# Patient Record
Sex: MALE | Race: OTHER | Employment: UNEMPLOYED | URBAN - METROPOLITAN AREA
[De-identification: names, ages, dates, MRNs, and addresses within clinical notes are randomized per-mention and may not be internally consistent; named-entity substitution may affect disease eponyms.]

---

## 2024-08-30 ENCOUNTER — APPOINTMENT (EMERGENCY)
Dept: CT IMAGING | Facility: HOSPITAL | Age: 39
End: 2024-08-30

## 2024-08-30 ENCOUNTER — HOSPITAL ENCOUNTER (EMERGENCY)
Facility: HOSPITAL | Age: 39
Discharge: HOME | End: 2024-08-31
Attending: EMERGENCY MEDICINE

## 2024-08-30 VITALS
SYSTOLIC BLOOD PRESSURE: 151 MMHG | RESPIRATION RATE: 18 BRPM | WEIGHT: 224.43 LBS | DIASTOLIC BLOOD PRESSURE: 91 MMHG | TEMPERATURE: 99.6 F | OXYGEN SATURATION: 97 % | HEART RATE: 77 BPM

## 2024-08-30 DIAGNOSIS — R22.2 MASS OF BUTTOCK: Primary | ICD-10-CM

## 2024-08-30 DIAGNOSIS — L03.317 CELLULITIS OF BUTTOCK: ICD-10-CM

## 2024-08-30 LAB
ALBUMIN SERPL BCG-MCNC: 4.5 G/DL (ref 3.5–5)
ALP SERPL-CCNC: 62 U/L (ref 34–104)
ALT SERPL W P-5'-P-CCNC: 19 U/L (ref 7–52)
ANION GAP SERPL CALCULATED.3IONS-SCNC: 8 MMOL/L (ref 4–13)
APTT PPP: 36 SECONDS (ref 23–34)
AST SERPL W P-5'-P-CCNC: 15 U/L (ref 13–39)
BASOPHILS # BLD AUTO: 0.06 THOUSANDS/ÂΜL (ref 0–0.1)
BASOPHILS NFR BLD AUTO: 1 % (ref 0–1)
BILIRUB SERPL-MCNC: 0.32 MG/DL (ref 0.2–1)
BUN SERPL-MCNC: 12 MG/DL (ref 5–25)
CALCIUM SERPL-MCNC: 9.3 MG/DL (ref 8.4–10.2)
CHLORIDE SERPL-SCNC: 101 MMOL/L (ref 96–108)
CO2 SERPL-SCNC: 28 MMOL/L (ref 21–32)
CREAT SERPL-MCNC: 0.95 MG/DL (ref 0.6–1.3)
EOSINOPHIL # BLD AUTO: 0.09 THOUSAND/ÂΜL (ref 0–0.61)
EOSINOPHIL NFR BLD AUTO: 1 % (ref 0–6)
ERYTHROCYTE [DISTWIDTH] IN BLOOD BY AUTOMATED COUNT: 12.7 % (ref 11.6–15.1)
GFR SERPL CREATININE-BSD FRML MDRD: 101 ML/MIN/1.73SQ M
GLUCOSE SERPL-MCNC: 105 MG/DL (ref 65–140)
HCT VFR BLD AUTO: 42.2 % (ref 36.5–49.3)
HGB BLD-MCNC: 14.3 G/DL (ref 12–17)
IMM GRANULOCYTES # BLD AUTO: 0.03 THOUSAND/UL (ref 0–0.2)
IMM GRANULOCYTES NFR BLD AUTO: 0 % (ref 0–2)
INR PPP: 1.11 (ref 0.85–1.19)
LACTATE SERPL-SCNC: 1.3 MMOL/L (ref 0.5–2)
LYMPHOCYTES # BLD AUTO: 1.45 THOUSANDS/ÂΜL (ref 0.6–4.47)
LYMPHOCYTES NFR BLD AUTO: 14 % (ref 14–44)
MCH RBC QN AUTO: 28.4 PG (ref 26.8–34.3)
MCHC RBC AUTO-ENTMCNC: 33.9 G/DL (ref 31.4–37.4)
MCV RBC AUTO: 84 FL (ref 82–98)
MONOCYTES # BLD AUTO: 0.49 THOUSAND/ÂΜL (ref 0.17–1.22)
MONOCYTES NFR BLD AUTO: 5 % (ref 4–12)
NEUTROPHILS # BLD AUTO: 8.32 THOUSANDS/ÂΜL (ref 1.85–7.62)
NEUTS SEG NFR BLD AUTO: 79 % (ref 43–75)
NRBC BLD AUTO-RTO: 0 /100 WBCS
PLATELET # BLD AUTO: 406 THOUSANDS/UL (ref 149–390)
PMV BLD AUTO: 9.7 FL (ref 8.9–12.7)
POTASSIUM SERPL-SCNC: 3.1 MMOL/L (ref 3.5–5.3)
PROCALCITONIN SERPL-MCNC: <0.05 NG/ML
PROT SERPL-MCNC: 7.6 G/DL (ref 6.4–8.4)
PROTHROMBIN TIME: 14.5 SECONDS (ref 12.3–15)
RBC # BLD AUTO: 5.04 MILLION/UL (ref 3.88–5.62)
SODIUM SERPL-SCNC: 137 MMOL/L (ref 135–147)
WBC # BLD AUTO: 10.44 THOUSAND/UL (ref 4.31–10.16)

## 2024-08-30 PROCEDURE — 36415 COLL VENOUS BLD VENIPUNCTURE: CPT

## 2024-08-30 PROCEDURE — 99285 EMERGENCY DEPT VISIT HI MDM: CPT

## 2024-08-30 PROCEDURE — 85610 PROTHROMBIN TIME: CPT

## 2024-08-30 PROCEDURE — 87040 BLOOD CULTURE FOR BACTERIA: CPT

## 2024-08-30 PROCEDURE — 96361 HYDRATE IV INFUSION ADD-ON: CPT

## 2024-08-30 PROCEDURE — 84145 PROCALCITONIN (PCT): CPT

## 2024-08-30 PROCEDURE — 80053 COMPREHEN METABOLIC PANEL: CPT

## 2024-08-30 PROCEDURE — 85025 COMPLETE CBC W/AUTO DIFF WBC: CPT

## 2024-08-30 PROCEDURE — 85730 THROMBOPLASTIN TIME PARTIAL: CPT

## 2024-08-30 PROCEDURE — 74177 CT ABD & PELVIS W/CONTRAST: CPT

## 2024-08-30 PROCEDURE — NC001 PR NO CHARGE: Performed by: SURGERY

## 2024-08-30 PROCEDURE — 96374 THER/PROPH/DIAG INJ IV PUSH: CPT

## 2024-08-30 PROCEDURE — 99283 EMERGENCY DEPT VISIT LOW MDM: CPT

## 2024-08-30 PROCEDURE — 83605 ASSAY OF LACTIC ACID: CPT

## 2024-08-30 RX ORDER — LIDOCAINE HYDROCHLORIDE 10 MG/ML
10 INJECTION, SOLUTION EPIDURAL; INFILTRATION; INTRACAUDAL; PERINEURAL ONCE
Status: COMPLETED | OUTPATIENT
Start: 2024-08-31 | End: 2024-08-30

## 2024-08-30 RX ORDER — HYDROMORPHONE HCL/PF 1 MG/ML
0.5 SYRINGE (ML) INJECTION
Status: DISCONTINUED | OUTPATIENT
Start: 2024-08-30 | End: 2024-08-31 | Stop reason: HOSPADM

## 2024-08-30 RX ADMIN — IOHEXOL 100 ML: 350 INJECTION, SOLUTION INTRAVENOUS at 21:52

## 2024-08-30 RX ADMIN — SODIUM CHLORIDE 1000 ML: 0.9 INJECTION, SOLUTION INTRAVENOUS at 21:19

## 2024-08-30 RX ADMIN — HYDROMORPHONE HYDROCHLORIDE 0.5 MG: 0.5 INJECTION, SOLUTION INTRAMUSCULAR; INTRAVENOUS; SUBCUTANEOUS at 23:58

## 2024-08-30 RX ADMIN — LIDOCAINE HYDROCHLORIDE 10 ML: 10 INJECTION, SOLUTION EPIDURAL; INFILTRATION; INTRACAUDAL at 23:56

## 2024-08-31 PROCEDURE — NC001 PR NO CHARGE: Performed by: SURGERY

## 2024-08-31 PROCEDURE — 88305 TISSUE EXAM BY PATHOLOGIST: CPT | Performed by: PATHOLOGY

## 2024-08-31 NOTE — DISCHARGE INSTRUCTIONS
Follow wound care instructions as discussed.  Complete course of Augmentin.  Follow-up with surgical specialist.

## 2024-08-31 NOTE — CONSULTS
Consult: General Surgery  Katheryn Downing 38 y.o. male MRN: [id number]  Unit/Bed#: ED-40 Encounter: [id number]        Assessment & Plan     Assessment:  Patient is a 38 y.o. male with a past medical history of hemorrhoids and underwent hemorrhoidectomy in [place] in 2018; he presented for worsening pain of the right gluteal cleft/perianal mass and given there was concern for perianal abscess General Surgery was consulted.  The mass did have a small amount of serous drainage but is nodular in nature; given the scant amount of drainage the mass was incised which confirmed that it was a solid mass and biopsies were sent.  There was a small amount of overlying cellulitis and induration which may be a superinfection.    Afebrile, VSS  WBC: 10  CTAP showed Area of soft tissue prominence noted within the subcutaneous soft tissues of the medial right gluteal region extending from the skin surface to the level of the right perianal region, suspicious for an infectious or inflammatory process. No soft tissue gas is identified. A perianal abscess/fistula cannot be excluded    Plan:  -Biopsies performed of the bilobed perianal/gluteal cleft mass  - Will send the patient on 4 days of Augmentin for potential superinfection  - Lab the patient follow-up in colorectal clinic     History of Present Illness     HPI:  Katheryn Downing is a 38 y.o. male with pmhx of hemorrhoids s/p hemorrhoidectomy in [place] in 2018, additionally he reports 2-year history of a right gluteal cleft/perianal mass.  He reports that the mass intermittently will become painful, and drain.  He said that over the past week is becoming more painful which brought him into the ED.  He denies fevers, chills, chest pain, shortness of breath, and dysuria.  He denies any pain with defecation, fecal incontinence, or leakage of stool in this area.  He denies any previous incision and drainages or procedures on this mass.  He denies any changes in the caliber of his  stools.  He does report intermittent rectal bleeding.  He denies ever having undergone a colonoscopy.  He denies night sweats, weight loss, fatigue, and appetite loss.    Review of Systems  As stated in above HPI, otherwise negative.  Consults    Historical Information   History reviewed. No pertinent past medical history.  History reviewed. No pertinent surgical history.  Social History   Social History     Substance and Sexual Activity   Alcohol Use Yes    Alcohol/week: 2.0 - 3.0 standard drinks of alcohol    Types: 2 - 3 Glasses of wine per week     Social History     Substance and Sexual Activity   Drug Use Never     Social History     Tobacco Use   Smoking Status Never    Passive exposure: Never   Smokeless Tobacco Never     Family History: non-contributory    Meds/Allergies   all medications and allergies reviewed  No Known Allergies    Objective   First Vitals:   Blood Pressure: 163/98 (08/30/24 1942)  Pulse: 102 (08/30/24 1942)  Temperature: 99.6 °F (37.6 °C) (08/30/24 1942)  Temp Source: Oral (08/30/24 1942)  Respirations: 18 (08/30/24 1942)  Weight - Scale: 102 kg (224 lb 6.9 oz) (08/30/24 1939)  SpO2: 100 % (08/30/24 1942)    Current Vitals:   Blood Pressure: 156/95 (08/30/24 2105)  Pulse: 98 (08/30/24 2105)  Temperature: 99.6 °F (37.6 °C) (08/30/24 1942)  Temp Source: Oral (08/30/24 1942)  Respirations: 19 (08/30/24 2105)  Weight - Scale: 102 kg (224 lb 6.9 oz) (08/30/24 1939)  SpO2: 99 % (08/30/24 2105)    No intake or output data in the 24 hours ending 08/30/24 2353    Invasive Devices       Peripheral Intravenous Line  Duration             Peripheral IV 08/30/24 Right Antecubital <1 day                    Physical Exam  General: NAD  Skin: Warm, dry, anicteric  HEENT: Normocephalic, atraumatic  CV: RRR  Pulm:  Nonlabored breathing on room air  Abd: Soft, ND/NT  Perineal: Digital rectal exam performed which did not reveal any masses or nodules, no blood or stool was present.  There was a~1-1/2 x 3 cm  bilobed nodular mass in the right gluteal cleft/perianal region with limited overlying erythema and induration.  There were 2 small areas of ulceration the more superior/lateral of which was draining clear fluid.  No purulent drainage was noted.  The area was moderately tender.  MSK: Symmetric, no edema, no tenderness, no deformity  Neuro: AOx3, GCS 15        Lab Results: I have personally reviewed pertinent lab results.    Imaging: I have personally reviewed pertinent reports.   and I have personally reviewed pertinent films in PACS  EKG, Pathology, and Other Studies: I have personally reviewed pertinent reports.      Code Status: No Order  Advance Directive and Living Will:      Power of :    POLST:

## 2024-08-31 NOTE — ED PROVIDER NOTES
"History  Chief Complaint   Patient presents with    Abscess     Pt reports 3 years ago having hemorrhoids and got surgery. After surgery abscess formed to left of rectum. Pt reports when pt sits on hard surfaces the abscess grows. Pt reports pain and foul smelling drainage from abscess.      Katheryn is a 38-year-old male presenting to the emergency department with a gluteal abscess x 2 years.  He reports that in 2022 he had a surgery to remove hemorrhoids.  After surgery an abscess formed on the right inner gluteal region.  He reports that it has occasionally been uncomfortable but over the past few days he has been noting more pain than usual.  Denies difficulty with bowel movements.  Upon presentation to the room the patient stood and blood began dripping down his legs.  It appears that the blood came from the rectum rather than the gluteal abscess.  He states that his pain feels improved after \"the blood is out\".  Denies fevers, abdominal pain, urinary symptoms.       Abscess  Location:  Pelvis  Pelvic abscess location:  Gluteal cleft  Size:  3 cm  Abscess quality: induration, painful and redness    Red streaking: no    Associated symptoms: no fever, no nausea and no vomiting        None       History reviewed. No pertinent past medical history.    History reviewed. No pertinent surgical history.    History reviewed. No pertinent family history.  I have reviewed and agree with the history as documented.    E-Cigarette/Vaping    E-Cigarette Use Never User      E-Cigarette/Vaping Substances     Social History     Tobacco Use    Smoking status: Never     Passive exposure: Never    Smokeless tobacco: Never   Vaping Use    Vaping status: Never Used   Substance Use Topics    Alcohol use: Yes     Alcohol/week: 2.0 - 3.0 standard drinks of alcohol     Types: 2 - 3 Glasses of wine per week    Drug use: Never       Review of Systems   Constitutional:  Negative for chills and fever.   Respiratory:  Negative for cough and " shortness of breath.    Cardiovascular:  Negative for chest pain.   Gastrointestinal:  Positive for anal bleeding and rectal pain. Negative for abdominal distention, abdominal pain, blood in stool, constipation, diarrhea, nausea and vomiting.   Genitourinary:  Negative for dysuria, frequency, hematuria and urgency.   Musculoskeletal:  Negative for back pain.   Skin:  Negative for color change and rash.   Neurological:  Negative for numbness.   All other systems reviewed and are negative.      Physical Exam  Physical Exam  Vitals and nursing note reviewed.   Constitutional:       General: He is not in acute distress.     Appearance: He is well-developed.   HENT:      Head: Normocephalic and atraumatic.   Eyes:      Conjunctiva/sclera: Conjunctivae normal.   Cardiovascular:      Rate and Rhythm: Normal rate and regular rhythm.      Heart sounds: No murmur heard.  Pulmonary:      Effort: Pulmonary effort is normal. No respiratory distress.      Breath sounds: Normal breath sounds.   Abdominal:      General: Bowel sounds are normal. There is no distension.      Palpations: Abdomen is soft.      Tenderness: There is no abdominal tenderness. There is no guarding.   Genitourinary:     Comments: Large protruding fluctuant mass from the right medial gluteal region.  Induration surrounding tracks to hurts the rectum.  No visible or palpable rectal involvement.  Gross blood present per rectum.  No visible drainage or bleeding from mass.  Musculoskeletal:         General: No swelling.      Cervical back: Neck supple.   Skin:     General: Skin is warm and dry.      Capillary Refill: Capillary refill takes less than 2 seconds.   Neurological:      General: No focal deficit present.      Mental Status: He is alert.   Psychiatric:         Mood and Affect: Mood normal.         Vital Signs  ED Triage Vitals   Temperature Pulse Respirations Blood Pressure SpO2   08/30/24 1942 08/30/24 1942 08/30/24 1942 08/30/24 1942 08/30/24 1942    99.6 °F (37.6 °C) 102 18 163/98 100 %      Temp Source Heart Rate Source Patient Position - Orthostatic VS BP Location FiO2 (%)   08/30/24 1942 08/30/24 1942 08/30/24 1942 08/30/24 1942 --   Oral Monitor Sitting Right arm       Pain Score       08/30/24 2358       4           Vitals:    08/30/24 1942 08/30/24 2105 08/30/24 2358   BP: 163/98 156/95 151/91   Pulse: 102 98 77   Patient Position - Orthostatic VS: Sitting Lying Lying         Visual Acuity      ED Medications  Medications   sodium chloride 0.9 % bolus 1,000 mL (0 mL Intravenous Stopped 8/31/24 0000)   iohexol (OMNIPAQUE) 350 MG/ML injection (MULTI-DOSE) 100 mL (100 mL Intravenous Given 8/30/24 2152)   lidocaine (PF) (XYLOCAINE-MPF) 1 % injection 10 mL (10 mL Infiltration Given by Other 8/30/24 2356)       Diagnostic Studies  Results Reviewed       Procedure Component Value Units Date/Time    Blood culture #1 [[id number]] Collected: 08/30/24 2102    Lab Status: Preliminary result Specimen: Blood from Arm, Left Updated: 08/31/24 0001     Blood Culture Received in Microbiology Lab. Culture in Progress.    Procalcitonin [[id number]]  (Normal) Collected: 08/30/24 2102    Lab Status: Final result Specimen: Blood from Arm, Left Updated: 08/30/24 2144     Procalcitonin <0.05 ng/ml     Comprehensive metabolic panel [[id number]]  (Abnormal) Collected: 08/30/24 2102    Lab Status: Final result Specimen: Blood from Arm, Left Updated: 08/30/24 2140     Sodium 137 mmol/L      Potassium 3.1 mmol/L      Chloride 101 mmol/L      CO2 28 mmol/L      ANION GAP 8 mmol/L      BUN 12 mg/dL      Creatinine 0.95 mg/dL      Glucose 105 mg/dL      Calcium 9.3 mg/dL      AST 15 U/L      ALT 19 U/L      Alkaline Phosphatase 62 U/L      Total Protein 7.6 g/dL      Albumin 4.5 g/dL      Total Bilirubin 0.32 mg/dL      eGFR 101 ml/min/1.73sq m     Narrative:      National Kidney Disease Foundation guidelines for Chronic Kidney Disease (CKD):     Stage 1 with normal or high GFR (GFR > 90  mL/min/1.73 square meters)    Stage 2 Mild CKD (GFR = 60-89 mL/min/1.73 square meters)    Stage 3A Moderate CKD (GFR = 45-59 mL/min/1.73 square meters)    Stage 3B Moderate CKD (GFR = 30-44 mL/min/1.73 square meters)    Stage 4 Severe CKD (GFR = 15-29 mL/min/1.73 square meters)    Stage 5 End Stage CKD (GFR <15 mL/min/1.73 square meters)  Note: GFR calculation is accurate only with a steady state creatinine    Lactic acid [[id number]]  (Normal) Collected: 08/30/24 2102    Lab Status: Final result Specimen: Blood from Arm, Left Updated: 08/30/24 2139     LACTIC ACID 1.3 mmol/L     Narrative:      Result may be elevated if tourniquet was used during collection.    Protime-INR [[id number]]  (Normal) Collected: 08/30/24 2102    Lab Status: Final result Specimen: Blood from Arm, Left Updated: 08/30/24 2133     Protime 14.5 seconds      INR 1.11    Narrative:      INR Therapeutic Range    Indication                                             INR Range      Atrial Fibrillation                                               2.0-3.0  Hypercoagulable State                                    2.0.2.3  Left Ventricular Asist Device                            2.0-3.0  Mechanical Heart Valve                                  -    Aortic(with afib, MI, embolism, HF, LA enlargement,    and/or coagulopathy)                                     2.0-3.0 (2.5-3.5)     Mitral                                                             2.5-3.5  Prosthetic/Bioprosthetic Heart Valve               2.0-3.0  Venous thromboembolism (VTE: VT, PE        2.0-3.0    APTT [[id number]]  (Abnormal) Collected: 08/30/24 2102    Lab Status: Final result Specimen: Blood from Arm, Left Updated: 08/30/24 2133     PTT 36 seconds     CBC and differential [[id number]]  (Abnormal) Collected: 08/30/24 2102    Lab Status: Final result Specimen: Blood from Arm, Left Updated: 08/30/24 2117     WBC 10.44 Thousand/uL      RBC 5.04 Million/uL      Hemoglobin 14.3 g/dL       Hematocrit 42.2 %      MCV 84 fL      MCH 28.4 pg      MCHC 33.9 g/dL      RDW 12.7 %      MPV 9.7 fL      Platelets 406 Thousands/uL      nRBC 0 /100 WBCs      Segmented % 79 %      Immature Grans % 0 %      Lymphocytes % 14 %      Monocytes % 5 %      Eosinophils Relative 1 %      Basophils Relative 1 %      Absolute Neutrophils 8.32 Thousands/µL      Absolute Immature Grans 0.03 Thousand/uL      Absolute Lymphocytes 1.45 Thousands/µL      Absolute Monocytes 0.49 Thousand/µL      Eosinophils Absolute 0.09 Thousand/µL      Basophils Absolute 0.06 Thousands/µL                    CT abdomen pelvis with contrast   Final Result by Terence Back MD (08/30 2234)      Area of soft tissue prominence noted within the subcutaneous soft tissues of the medial right gluteal region extending from the skin surface to the level of the right perianal region, suspicious for an infectious or inflammatory process. No soft tissue    gas is identified. Clinical correlation is recommended. A perianal abscess/fistula cannot be excluded.      No acute intra-abdominal abnormality. No free air or free fluid.         Workstation performed: [id number]                    Procedures  Procedures         ED Course  ED Course as of 08/31/24 0739   Fri Aug 30, 2024   2123 WBC(!): 10.44   Sat Aug 31, 2024   0012 Surgery at bedside                                 SBIRT 22yo+      Flowsheet Row Most Recent Value   Initial Alcohol Screen: US AUDIT-C     1. How often do you have a drink containing alcohol? 3 Filed at: 08/30/2024 1949   2. How many drinks containing alcohol do you have on a typical day you are drinking?  2 Filed at: 08/30/2024 1949   3a. Male UNDER 65: How often do you have five or more drinks on one occasion? 0 Filed at: 08/30/2024 1949   3b. FEMALE Any Age, or MALE 65+: How often do you have 4 or more drinks on one occassion? 0 Filed at: 08/30/2024 1949   Audit-C Score 5 Filed at: 08/30/2024 1949   YUVAL: How many times in the past  "year have you...    Used an illegal drug or used a prescription medication for non-medical reasons? Never Filed at: 08/30/2024 1949                      Medical Decision Making  DDx includes gluteal abscess, rectal abscess, rectal fistula, hemorrhoids  CBC ordered to rule out infection, anemia.  CMP ordered to rule out electrolyte abnormality, kidney injury, liver injury.  Patient is tachycardic with a possible source for infection.  Lactic, Pro-Sergio, coags obtained.  CT abdomen pelvis ordered to evaluate depth of mass and rule out fistula and anal involvement.  Surgery was consulted and performed excision and sent biopsy to the lab.    Discussed findings from the visit with the patient.  We had a conversation regarding supportive care and indications for return.  Recommended appropriate follow-up.  Patient and/or family understand and agree with plan.    Portions of the record may have been created with voice recognition software. Occasional use of the incorrect word or \"sound a like\" substitutions may have occurred due to the inherent limitations of voice recognition software. Read the chart carefully and recognize, using context, where substitutions have occurred.         Amount and/or Complexity of Data Reviewed  Labs: ordered. Decision-making details documented in ED Course.  Radiology: ordered.    Risk  Prescription drug management.                 Disposition  Final diagnoses:   Cellulitis of buttock   Mass of buttock     Time reflects when diagnosis was documented in both MDM as applicable and the Disposition within this note       Time User Action Codes Description Comment    8/31/2024 12:32 AM Amy Benson [L03.317] Cellulitis of buttock     8/31/2024 12:32 AM Amy Benson [R22.2] Mass of buttock           ED Disposition       ED Disposition   Discharge    Condition   Stable    Date/Time   Sat Aug 31, 2024 0031    Comment   Katheryn Downing discharge to home/self care.               "     Follow-up Information       Follow up With Specialties Details Why Contact Info    [facility] Colorectal Surgery Pod Colon and Rectal Surgery Follow up in 1 week(s)  [address] [facility] [address] [phone]            Discharge Medication List as of 8/31/2024 12:34 AM        START taking these medications    Details   amoxicillin-clavulanate (AUGMENTIN) 875-125 mg per tablet Take 1 tablet by mouth every 12 (twelve) hours for 5 days, Starting Sat 8/31/2024, Until Thu 9/5/2024, Normal             No discharge procedures on file.    PDMP Review       None            ED Provider  Electronically Signed by             Amy Benson PA-C  08/31/24 0750

## 2024-08-31 NOTE — PROCEDURES
Incision and drain    Date/Time: 8/31/2024 5:58 AM    Performed by: Dustin Huang MD  Authorized by: Dustin Huang MD  Universal Protocol:  Consent: Verbal consent obtained.  Consent given by: patient  Patient identity confirmed: verbally with patient    Patient location:  ED  Location:     Indications for incision and drainage: Gluteal cleft/perianal draining mass.    Location:  Anogenital    Anogenital location:  Perianal  Pre-procedure details:     Skin preparation:  Betadine  Anesthesia (see MAR for exact dosages):     Anesthesia method:  Local infiltration  Procedure details:     Complexity:  Simple    Incision types:  Stab incision    Scalpel blade:  11  Comments:      There is a bilobed mass present in the right gluteal cleft/perianal area which was draining a minimal amount of serous fluid although did seem solid in nature.  A small stab incision was performed in 2 separate areas both of which were probed without any evidence of tracking or fluid-filled cavity.  2 separate biopsies were obtained at the respective incision sites and were sent to the lab in formalin for pathology.

## 2024-09-03 ENCOUNTER — TELEPHONE (OUTPATIENT)
Age: 39
End: 2024-09-03

## 2024-09-03 NOTE — TELEPHONE ENCOUNTER
Patient callling In for an appt and is self pay   Estimate created please mail to patient as he does not have mychart set up

## 2024-09-04 LAB — BACTERIA BLD CULT: NORMAL

## 2024-09-06 ENCOUNTER — OFFICE VISIT (OUTPATIENT)
Dept: SURGERY | Facility: CLINIC | Age: 39
End: 2024-09-06

## 2024-09-06 VITALS
TEMPERATURE: 97.6 F | SYSTOLIC BLOOD PRESSURE: 126 MMHG | BODY MASS INDEX: 32.88 KG/M2 | HEIGHT: 69 IN | DIASTOLIC BLOOD PRESSURE: 82 MMHG | HEART RATE: 82 BPM | WEIGHT: 222 LBS | OXYGEN SATURATION: 99 %

## 2024-09-06 DIAGNOSIS — K60.4 RECTAL FISTULA: Primary | ICD-10-CM

## 2024-09-06 PROCEDURE — 99202 OFFICE O/P NEW SF 15 MIN: CPT | Performed by: SURGERY

## 2024-09-06 NOTE — PROGRESS NOTES
This is an interesting referral from the emergency room.  This is a 38-year-old Korean-speaking male who has a 2-year history, at least, of a mass in the right buttock.  This supposedly came up after hemorrhoid operation.  Over the last couple years he has not had pain, but he has drainage.  The drainage is usually bloody.  He was seen in the emergency room the other day, had a CAT scan as well as a biopsy done.  He was also started on antibiotics.  He states he does feel a little bit better after the course of antibiotics.  Biopsy is not yet available.  There is no personal or family history of Crohn's disease or ulcerative colitis or tuberculosis.  He has never had a flexible sigmoidoscopy or colonoscopy.  He is gaining weight  He has a 3 cm raised flesh-colored irregular mass on the right medial buttock.  This is not in the rebecca cleft and it does not go to the anus.  It is not especially tender, which is actually bothersome.  I told him to get a sitz bath apparatus and take 2 sitz bath's a day.  I have asked him to go see colorectal surgery and I told him he would need a biopsy but that is already been done.  If he is holley he is got a fistula.  If he is not holley he has a carcinoma in the a fistulous tract and I think that this is a definite possibility.  This is a little bit more than I think I should be taking care of and hence I am sending him directly to colorectal surgery

## 2024-09-09 PROCEDURE — 88305 TISSUE EXAM BY PATHOLOGIST: CPT | Performed by: PATHOLOGY

## 2024-09-24 NOTE — PROGRESS NOTES
"Ambulatory Visit  Name: Katheryn Downing      : 1985      MRN: [id number]  Encounter Provider: Gabriela Swartz MD  Encounter Date: 2024   Encounter department: [facility] COLON [facility] RECTAL [facility]GERY [place]    Assessment & Plan  Rectal fistula  Patient had surgery to address anorectal fistula in the [place] in 2018  Since then, he has had persistent symptoms of swelling, drainage, and pain  On exam, he does have an active abscess that required incision and drainage at bedside  Due to concerns of complex perianal fistula, I have ordered an MRI for further delineation of anatomy  Patient is requesting anesthesia for MRI, due to history of claustrophobia  We did discuss the need for surgical intervention to address fistula definitively  He will follow-up after MRI  Orders:    Ambulatory Referral to Colorectal Surgery    MRI pelvis perianal fistula wo and w contrast; Future    Tissue Exam; Future    Tissue Exam      History of Present Illness     Katheryn Downing is a 38 y.o. male who presents referred by Dr. Robert Bloch for rectal fistula.    The patient notes onset of symptoms happened after surgery for perianal fistula in the [place], back in 2018. Notes mass of right buttock that continues to cause discomfort, oozing pus and bleeding.     Patient seen by General Surgery, Dr. Bloch on 2024, Office visit note includes:  \"He has a 3 cm raised flesh-colored irregular mass on the right medial buttock.  This is not in the  cleft and it does not go to the anus.  It is not especially tender, which is actually bothersome. I told him to get a sitz bath apparatus and take 2 sitz bath's a day.  I have asked him to go see colorectal surgery and I told him he would need a biopsy but that is already been done. If he is holley he is got a fistula.  If he is not holley he has a carcinoma in the a fistulous tract and I think that this is a definite possibility.  This is a little bit " "more than I think I should be taking care of and hence I am sending him directly to colorectal surgery\"    Patient presented to the emergency room on 8/30/2024 for pain and foul smelling drainage from reported gluteal abscess.  Physical exam showed: Comments: Large protruding fluctuant mass from the right medial gluteal region.  Induration surrounding tracks to hurts the rectum.  No visible or palpable rectal involvement.  Gross blood present per rectum.  No visible drainage or bleeding from mass.     Patient reported that in 2022 he had a surgery to remove hemorrhoids. After surgery an abscess formed on the right inner gluteal region.     I&D attempted on 8/31/2024 by Dr. Dustin Huang. Comments: ' There is a bilobed mass present in the right gluteal cleft/perianal   area which was draining a minimal amount of serous fluid although did seem solid in nature.  A small stab incision was performed in 2 separate areas both of which were probed without any evidence of tracking or fluid-filled cavity.  2 separate biopsies were obtained at the respective incision sites and were sent to the lab in formalin for pathology.'    Tissue exam:   A. Skin, perianal/gluteal:  ULCER WITH PSEUDOCARCINOMATOUS HYPERPLASIA AND GRANULATION TISSUE  Note: There is no evidence of malignancy. Deeper sections were also evaluated.    CT abdomen and pelvis on 8/30: Area of soft tissue prominence noted within the subcutaneous soft tissues of the medial right gluteal region extending from the skin surface to the level of the right perianal region, suspicious for an infectious or inflammatory process. No soft tissue gas is identified. Clinical correlation is recommended. A perianal abscess/fistula cannot be excluded. No acute intra-abdominal abnormality. No free air or free fluid.  History obtained from : patient  Review of Systems   Constitutional:  Negative for chills and fever.   HENT:  Negative for ear pain and sore throat.    Eyes:  " "Negative for pain and visual disturbance.   Respiratory:  Negative for cough and shortness of breath.    Cardiovascular:  Negative for chest pain and palpitations.   Gastrointestinal:  Positive for rectal pain. Negative for abdominal pain and vomiting.   Genitourinary:  Negative for dysuria and hematuria.   Musculoskeletal:  Negative for arthralgias and back pain.   Skin:  Negative for color change and rash.   Neurological:  Negative for seizures and syncope.   All other systems reviewed and are negative.    Past Medical History   History reviewed. No pertinent past medical history.  History reviewed. No pertinent surgical history.  History reviewed. No pertinent family history.  No current outpatient medications on file prior to visit.     No current facility-administered medications on file prior to visit.   No Known Allergies       Objective     Ht 5' 9\" (1.753 m)   Wt 103 kg (227 lb)   BMI 33.52 kg/m²     Physical Exam  Vitals and nursing note reviewed.   Constitutional:       General: He is not in acute distress.     Appearance: He is well-developed.   HENT:      Head: Normocephalic and atraumatic.   Eyes:      Conjunctiva/sclera: Conjunctivae normal.   Cardiovascular:      Rate and Rhythm: Normal rate and regular rhythm.      Heart sounds: No murmur heard.  Pulmonary:      Effort: Pulmonary effort is normal. No respiratory distress.      Breath sounds: Normal breath sounds.   Abdominal:      Palpations: Abdomen is soft.      Tenderness: There is no abdominal tenderness.   Genitourinary:     Comments: 4 x 3 cm area of skin thickening and induration at right lateral buttock with active purulence noted  Digital anorectal exam and anoscopy deferred due to patient discomfort  Musculoskeletal:         General: No swelling.      Cervical back: Neck supple.   Skin:     General: Skin is warm and dry.      Capillary Refill: Capillary refill takes less than 2 seconds.   Neurological:      Mental Status: He is alert. " "  Psychiatric:         Mood and Affect: Mood normal.     Biopsy    Date/Time: 9/25/2024 4:00 PM    Performed by: Gabriela Swartz MD  Authorized by: Gabriela Swartz MD  Universal Protocol:  procedure performed by consultantConsent: Verbal consent obtained.  Consent given by: patient  Time out: Immediately prior to procedure a \"time out\" was called to verify the correct patient, procedure, equipment, support staff and site/side marked as required.  Patient identity confirmed: verbally with patient    Procedure Details - Lesion Biopsy:     Body area:  Anogenital    Biopsy method: punch biopsy      Biopsy tissue type: skin and subcutaneous    Initial size (mm):  4    Final defect size (mm):  4    Malignancy: malignancy unknown        Administrative Statements   I have spent a total time of 36 minutes in caring for this patient on the day of the visit/encounter including Diagnostic results, Prognosis, Risks and benefits of tx options, Instructions for management, Patient and family education, Importance of tx compliance, Risk factor reductions, Impressions, Counseling / Coordination of care, Documenting in the medical record, Reviewing / ordering tests, medicine, procedures  , Obtaining or reviewing history  , and Communicating with other healthcare professionals .  "

## 2024-09-25 ENCOUNTER — CONSULT (OUTPATIENT)
Age: 39
End: 2024-09-25

## 2024-09-25 VITALS — WEIGHT: 227 LBS | BODY MASS INDEX: 33.62 KG/M2 | HEIGHT: 69 IN

## 2024-09-25 DIAGNOSIS — K60.40 RECTAL FISTULA: Primary | ICD-10-CM

## 2024-09-25 PROCEDURE — 88305 TISSUE EXAM BY PATHOLOGIST: CPT | Performed by: PATHOLOGY

## 2024-09-30 PROCEDURE — 88305 TISSUE EXAM BY PATHOLOGIST: CPT | Performed by: PATHOLOGY

## 2024-10-16 ENCOUNTER — TELEPHONE (OUTPATIENT)
Age: 39
End: 2024-10-16

## 2024-10-16 DIAGNOSIS — K60.40 RECTAL FISTULA: Primary | ICD-10-CM

## 2024-10-16 NOTE — TELEPHONE ENCOUNTER
Contacted pt and reviewed; pt noted he would like help scheduling his MRI. After contacting Central Scheduling was told MRI could only be done at [facility] and dates available only in February 2025 due to sedation requirement. Attempted to contact pt back to review options further and schedule recommended OV, however was unable to reach pt at this point. Will attempt again at a later time. Pt advised to contact ofc; CB information provided on V/M.

## 2024-10-16 NOTE — TELEPHONE ENCOUNTER
used during phone call. Patients aunt Maria C calling in regards to the MRI order.  Is asking if the order can be changed to no sedation as there is no soon appointments available to schedule with sedation.  Will schedule office visit when hearing back about MRI order change.

## 2024-10-16 NOTE — TELEPHONE ENCOUNTER
----- Message from Gabriela Swartz MD sent at 10/15/2024 10:43 AM EDT -----  Hi! Can we please reach out to this patient to come back to the office for a wound check and to discuss next steps?  ----- Message -----  From: Lab, Background User  Sent: 9/30/2024   1:23 PM EDT  To: Gabriela Swartz MD

## 2024-10-18 NOTE — TELEPHONE ENCOUNTER
Patients Aunt called and I did inform of the information below, she will look into and give us a call back thank you

## 2024-10-18 NOTE — TELEPHONE ENCOUNTER
MRI order updated in the system to no sedation.    As per central scheduling opening available for 11/4/24 at 2:45 PM in [place] location by Axel MATAMOROS, and 10/31/24 at 4:15 AM at [place] location by Irma valentine.     Contacted price checkers as pt has no listed insurance. Expected price for self pay pt is around $2,200 plus reading fee $250. If pt was to pre-pay (Pay before the test is done) he would just have to pay $850.      Attempted to reach pt and aunt, Maria C, to review and schedule f/u office visit for wound check, however was unable to reach; v/m left for pt to contact ofc. CB information provided.

## 2024-10-21 NOTE — TELEPHONE ENCOUNTER
Contacted pt and relayed information.  Offered to schedule recommended/advised office visit, however pt defers at this time and noted his aunt will be contacting the office at a later time to schedule as she is his ride.     Pt aware of price estimate and knows to call the office for help or directly schedule when ready.